# Patient Record
Sex: MALE | Race: OTHER | HISPANIC OR LATINO | URBAN - METROPOLITAN AREA
[De-identification: names, ages, dates, MRNs, and addresses within clinical notes are randomized per-mention and may not be internally consistent; named-entity substitution may affect disease eponyms.]

---

## 2023-06-18 ENCOUNTER — EMERGENCY (EMERGENCY)
Facility: HOSPITAL | Age: 23
LOS: 1 days | Discharge: HOME | End: 2023-06-18
Attending: EMERGENCY MEDICINE | Admitting: EMERGENCY MEDICINE
Payer: MEDICAID

## 2023-06-18 VITALS
OXYGEN SATURATION: 99 % | RESPIRATION RATE: 18 BRPM | DIASTOLIC BLOOD PRESSURE: 81 MMHG | HEART RATE: 70 BPM | TEMPERATURE: 98 F | SYSTOLIC BLOOD PRESSURE: 107 MMHG

## 2023-06-18 VITALS
SYSTOLIC BLOOD PRESSURE: 114 MMHG | WEIGHT: 289.91 LBS | OXYGEN SATURATION: 97 % | RESPIRATION RATE: 17 BRPM | HEIGHT: 76 IN | TEMPERATURE: 98 F | DIASTOLIC BLOOD PRESSURE: 76 MMHG | HEART RATE: 82 BPM

## 2023-06-18 PROCEDURE — 99282 EMERGENCY DEPT VISIT SF MDM: CPT

## 2023-06-18 PROCEDURE — 99284 EMERGENCY DEPT VISIT MOD MDM: CPT

## 2023-06-18 RX ORDER — MULTIVIT WITH MIN/MFOLATE/K2 340-15/3 G
296 POWDER (GRAM) ORAL ONCE
Refills: 0 | Status: COMPLETED | OUTPATIENT
Start: 2023-06-18 | End: 2023-06-18

## 2023-06-18 RX ADMIN — Medication 296 MILLILITER(S): at 15:26

## 2023-06-18 NOTE — ED PROVIDER NOTE - CLINICAL SUMMARY MEDICAL DECISION MAKING FREE TEXT BOX
Pt with constipation x several days - appears well, no clinical symptoms to suggest obstruction, abd soft, pt with poor diet.     Counseled on diet modification and given mag citrate in ED.

## 2023-06-18 NOTE — ED ADULT TRIAGE NOTE - CHIEF COMPLAINT QUOTE
Pt reporting constipation and abdominal pain x months worse over past 2 weeks. LBM yesterday. No pmhx, no abdominal surgeries.

## 2023-06-18 NOTE — ED PROVIDER NOTE - NSFOLLOWUPINSTRUCTIONS_ED_ALL_ED_FT
Take magnesium citrate as indicated.  On a daily basis, drink plenty of water, eat fruits and vegetables and take metamucil and colace (docusate) over the counter as a stool softener.      Constipation, Adult  Constipation is when a person has fewer than three bowel movements in a week, has difficulty having a bowel movement, or has stools (feces) that are dry, hard, or larger than normal. Constipation may be caused by an underlying condition. It may become worse with age if a person takes certain medicines and does not take in enough fluids.    Follow these instructions at home:  Eating and drinking      Eat foods that have a lot of fiber, such as beans, whole grains, and fresh fruits and vegetables.  Limit foods that are low in fiber and high in fat and processed sugars, such as fried or sweet foods. These include french fries, hamburgers, cookies, candies, and soda.  Drink enough fluid to keep your urine pale yellow.  General instructions    Exercise regularly or as told by your health care provider. Try to do 150 minutes of moderate exercise each week.  Use the bathroom when you have the urge to go. Do not hold it in.  Take over-the-counter and prescription medicines only as told by your health care provider. This includes any fiber supplements.  During bowel movements:  Practice deep breathing while relaxing the lower abdomen.  Practice pelvic floor relaxation.  Watch your condition for any changes. Let your health care provider know about them.  Keep all follow-up visits as told by your health care provider. This is important.  Contact a health care provider if:  You have pain that gets worse.  You have a fever.  You do not have a bowel movement after 4 days.  You vomit.  You are not hungry or you lose weight.  You are bleeding from the opening between the buttocks (anus).  You have thin, pencil-like stools.  Get help right away if:  You have a fever and your symptoms suddenly get worse.  You leak stool or have blood in your stool.  Your abdomen is bloated.  You have severe pain in your abdomen.  You feel dizzy or you faint.  Summary  Constipation is when a person has fewer than three bowel movements in a week, has difficulty having a bowel movement, or has stools (feces) that are dry, hard, or larger than normal.  Eat foods that have a lot of fiber, such as beans, whole grains, and fresh fruits and vegetables.  Drink enough fluid to keep your urine pale yellow.  Take over-the-counter and prescription medicines only as told by your health care provider. This includes any fiber supplements.  This information is not intended to replace advice given to you by your health care provider. Make sure you discuss any questions you have with your health care provider.    Document Revised: 11/04/2020 Document Reviewed: 11/04/2020

## 2023-06-18 NOTE — ED PROVIDER NOTE - OBJECTIVE STATEMENT
23 y/o m with no sig pmh presents to ED with constipation x several days.  As per patient, this has happened to him intermittently in the past and pt has taken laxative which has helped. Pt states he only eats once daily and does not eat fruits or vegetables and does not drink a lot of water.  Denies nausea, vomiting, fever, chills, urinary symptoms.  NO hx of abd surgeries.

## 2023-06-18 NOTE — ED PROVIDER NOTE - CHIEF COMPLAINT
Bed: 45  Expected date: 12/16/20  Expected time:   Means of arrival:   Comments:  charge   The patient is a 22y Male complaining of abdominal pain.

## 2023-06-18 NOTE — ED PROVIDER NOTE - HISTORY ATTESTATION, MLM
Urine, green and lavender top sent to lab prior to orders placed     Valeria Paez, [facility]0 [facility]  11/28/21 3426 I have reviewed and confirmed nurses' notes...

## 2023-06-18 NOTE — ED ADULT NURSE NOTE - OBJECTIVE STATEMENT
Pt presenting with 2 weeks of constipation. Last BM yesterday states was small and green. Had 3 episodes of darker stool that has resolved. Endorsing nausea this morning with no vomiting that has since resolved. No fevers.

## 2023-06-18 NOTE — ED PROVIDER NOTE - PATIENT PORTAL LINK FT
You can access the FollowMyHealth Patient Portal offered by [facility] by registering at the following website: [email or web address]. By joining [employer] [facility]’s FollowMyHealth portal, you will also be able to view your health information using other applications (apps) compatible with our system.

## 2023-06-18 NOTE — ED ADULT NURSE NOTE - NSFALLUNIVINTERV_ED_ALL_ED
Bed/Stretcher in lowest position, wheels locked, appropriate side rails in place/Call bell, personal items and telephone in reach/Instruct patient to call for assistance before getting out of bed/chair/stretcher/Non-slip footwear applied when patient is off stretcher/[place] to call system/Physically safe environment - no spills, clutter or unnecessary equipment/Purposeful proactive rounding/Room/bathroom lighting operational, light cord in reach

## 2023-06-20 DIAGNOSIS — K59.00 CONSTIPATION, UNSPECIFIED: ICD-10-CM

## 2023-06-20 DIAGNOSIS — R10.9 UNSPECIFIED ABDOMINAL PAIN: ICD-10-CM

## 2023-06-22 ENCOUNTER — EMERGENCY (EMERGENCY)
Facility: HOSPITAL | Age: 23
LOS: 1 days | Discharge: HOME | End: 2023-06-22
Attending: STUDENT IN AN ORGANIZED HEALTH CARE EDUCATION/TRAINING PROGRAM | Admitting: STUDENT IN AN ORGANIZED HEALTH CARE EDUCATION/TRAINING PROGRAM
Payer: MEDICAID

## 2023-06-22 VITALS
RESPIRATION RATE: 16 BRPM | HEIGHT: 76 IN | HEART RATE: 73 BPM | WEIGHT: 289.91 LBS | OXYGEN SATURATION: 99 % | SYSTOLIC BLOOD PRESSURE: 114 MMHG | TEMPERATURE: 98 F | DIASTOLIC BLOOD PRESSURE: 69 MMHG

## 2023-06-22 VITALS
HEART RATE: 65 BPM | DIASTOLIC BLOOD PRESSURE: 71 MMHG | OXYGEN SATURATION: 99 % | RESPIRATION RATE: 18 BRPM | SYSTOLIC BLOOD PRESSURE: 118 MMHG | TEMPERATURE: 98 F

## 2023-06-22 DIAGNOSIS — R10.84 GENERALIZED ABDOMINAL PAIN: ICD-10-CM

## 2023-06-22 DIAGNOSIS — R11.0 NAUSEA: ICD-10-CM

## 2023-06-22 LAB
ALBUMIN SERPL ELPH-MCNC: 4.2 G/DL — SIGNIFICANT CHANGE UP (ref 3.3–5)
ALP SERPL-CCNC: 105 U/L — SIGNIFICANT CHANGE UP (ref 40–120)
ALT FLD-CCNC: 18 U/L — SIGNIFICANT CHANGE UP (ref 10–45)
ANION GAP SERPL CALC-SCNC: 13 MMOL/L — SIGNIFICANT CHANGE UP (ref 5–17)
APPEARANCE UR: CLEAR — SIGNIFICANT CHANGE UP
AST SERPL-CCNC: 16 U/L — SIGNIFICANT CHANGE UP (ref 10–40)
BASOPHILS # BLD AUTO: 0.04 K/UL — SIGNIFICANT CHANGE UP (ref 0–0.2)
BASOPHILS NFR BLD AUTO: 0.5 % — SIGNIFICANT CHANGE UP (ref 0–2)
BILIRUB SERPL-MCNC: 0.3 MG/DL — SIGNIFICANT CHANGE UP (ref 0.2–1.2)
BILIRUB UR-MCNC: NEGATIVE — SIGNIFICANT CHANGE UP
BUN SERPL-MCNC: 10 MG/DL — SIGNIFICANT CHANGE UP (ref 7–23)
CALCIUM SERPL-MCNC: 9.6 MG/DL — SIGNIFICANT CHANGE UP (ref 8.4–10.5)
CHLORIDE SERPL-SCNC: 102 MMOL/L — SIGNIFICANT CHANGE UP (ref 96–108)
CO2 SERPL-SCNC: 28 MMOL/L — SIGNIFICANT CHANGE UP (ref 22–31)
COLOR SPEC: YELLOW — SIGNIFICANT CHANGE UP
CREAT SERPL-MCNC: 0.95 MG/DL — SIGNIFICANT CHANGE UP (ref 0.5–1.3)
DIFF PNL FLD: NEGATIVE — SIGNIFICANT CHANGE UP
EGFR: 118 ML/MIN/1.73M2 — SIGNIFICANT CHANGE UP
EOSINOPHIL # BLD AUTO: 0.1 K/UL — SIGNIFICANT CHANGE UP (ref 0–0.5)
EOSINOPHIL NFR BLD AUTO: 1.2 % — SIGNIFICANT CHANGE UP (ref 0–6)
GLUCOSE SERPL-MCNC: 93 MG/DL — SIGNIFICANT CHANGE UP (ref 70–99)
GLUCOSE UR QL: NEGATIVE — SIGNIFICANT CHANGE UP
HCT VFR BLD CALC: 45.8 % — SIGNIFICANT CHANGE UP (ref 39–50)
HGB BLD-MCNC: 15.2 G/DL — SIGNIFICANT CHANGE UP (ref 13–17)
IMM GRANULOCYTES NFR BLD AUTO: 0.2 % — SIGNIFICANT CHANGE UP (ref 0–0.9)
KETONES UR-MCNC: NEGATIVE — SIGNIFICANT CHANGE UP
LEUKOCYTE ESTERASE UR-ACNC: NEGATIVE — SIGNIFICANT CHANGE UP
LIDOCAIN IGE QN: 32 U/L — SIGNIFICANT CHANGE UP (ref 7–60)
LYMPHOCYTES # BLD AUTO: 2.27 K/UL — SIGNIFICANT CHANGE UP (ref 1–3.3)
LYMPHOCYTES # BLD AUTO: 28.2 % — SIGNIFICANT CHANGE UP (ref 13–44)
MCHC RBC-ENTMCNC: 28.4 PG — SIGNIFICANT CHANGE UP (ref 27–34)
MCHC RBC-ENTMCNC: 33.2 GM/DL — SIGNIFICANT CHANGE UP (ref 32–36)
MCV RBC AUTO: 85.6 FL — SIGNIFICANT CHANGE UP (ref 80–100)
MONOCYTES # BLD AUTO: 0.88 K/UL — SIGNIFICANT CHANGE UP (ref 0–0.9)
MONOCYTES NFR BLD AUTO: 10.9 % — SIGNIFICANT CHANGE UP (ref 2–14)
NEUTROPHILS # BLD AUTO: 4.73 K/UL — SIGNIFICANT CHANGE UP (ref 1.8–7.4)
NEUTROPHILS NFR BLD AUTO: 59 % — SIGNIFICANT CHANGE UP (ref 43–77)
NITRITE UR-MCNC: NEGATIVE — SIGNIFICANT CHANGE UP
NRBC # BLD: 0 /100 WBCS — SIGNIFICANT CHANGE UP (ref 0–0)
PH UR: 6 — SIGNIFICANT CHANGE UP (ref 5–8)
PLATELET # BLD AUTO: 219 K/UL — SIGNIFICANT CHANGE UP (ref 150–400)
POTASSIUM SERPL-MCNC: 3.8 MMOL/L — SIGNIFICANT CHANGE UP (ref 3.5–5.3)
POTASSIUM SERPL-SCNC: 3.8 MMOL/L — SIGNIFICANT CHANGE UP (ref 3.5–5.3)
PROT SERPL-MCNC: 7.5 G/DL — SIGNIFICANT CHANGE UP (ref 6–8.3)
PROT UR-MCNC: NEGATIVE MG/DL — SIGNIFICANT CHANGE UP
RBC # BLD: 5.35 M/UL — SIGNIFICANT CHANGE UP (ref 4.2–5.8)
RBC # FLD: 13 % — SIGNIFICANT CHANGE UP (ref 10.3–14.5)
SODIUM SERPL-SCNC: 143 MMOL/L — SIGNIFICANT CHANGE UP (ref 135–145)
SP GR SPEC: >=1.03 — SIGNIFICANT CHANGE UP (ref 1–1.03)
UROBILINOGEN FLD QL: 1 E.U./DL — SIGNIFICANT CHANGE UP
WBC # BLD: 8.04 K/UL — SIGNIFICANT CHANGE UP (ref 3.8–10.5)
WBC # FLD AUTO: 8.04 K/UL — SIGNIFICANT CHANGE UP (ref 3.8–10.5)

## 2023-06-22 PROCEDURE — 96374 THER/PROPH/DIAG INJ IV PUSH: CPT

## 2023-06-22 PROCEDURE — 83690 ASSAY OF LIPASE: CPT

## 2023-06-22 PROCEDURE — 99284 EMERGENCY DEPT VISIT MOD MDM: CPT | Mod: 25

## 2023-06-22 PROCEDURE — 85025 COMPLETE CBC W/AUTO DIFF WBC: CPT

## 2023-06-22 PROCEDURE — 80053 COMPREHEN METABOLIC PANEL: CPT

## 2023-06-22 PROCEDURE — 96375 TX/PRO/DX INJ NEW DRUG ADDON: CPT

## 2023-06-22 PROCEDURE — 36415 COLL VENOUS BLD VENIPUNCTURE: CPT

## 2023-06-22 PROCEDURE — 99284 EMERGENCY DEPT VISIT MOD MDM: CPT

## 2023-06-22 PROCEDURE — 81003 URINALYSIS AUTO W/O SCOPE: CPT

## 2023-06-22 RX ORDER — FAMOTIDINE 10 MG/ML
20 INJECTION INTRAVENOUS ONCE
Refills: 0 | Status: COMPLETED | OUTPATIENT
Start: 2023-06-22 | End: 2023-06-22

## 2023-06-22 RX ORDER — KETOROLAC TROMETHAMINE 30 MG/ML
15 SYRINGE (ML) INJECTION ONCE
Refills: 0 | Status: DISCONTINUED | OUTPATIENT
Start: 2023-06-22 | End: 2023-06-22

## 2023-06-22 RX ADMIN — Medication 15 MILLIGRAM(S): at 17:27

## 2023-06-22 RX ADMIN — FAMOTIDINE 20 MILLIGRAM(S): 10 INJECTION INTRAVENOUS at 17:27

## 2023-06-22 RX ADMIN — Medication 30 MILLILITER(S): at 17:27

## 2023-06-22 NOTE — ED PROVIDER NOTE - PHYSICAL EXAMINATION
CONSTITUTIONAL: Awake, alert.  Nontoxic, no acute distress.    HEAD: Normocephalic, atraumatic.    EYES: Conjunctivae clear without exudates or hemorrhage. Sclera is non-icteric.    ENT: Normal appearing external ears, nose, mucous membranes moist.    NECK: supple, trachea midline.    HEART:  Normal rate, regular rhythm.  Heart sounds S1, S2.  No murmurs, rubs or gallops.    LUNGS:  No acute respiratory distress.  Non-tachypneic and non-labored.  Lungs are clear bilaterally with good aeration.  No wheezing, rales, rhonchi.    ABDOMEN: Normal appearing skin without lesions, rashes.  Normal bowel sounds x 4.  Soft, non-distended, non-tender in all four quadrants. No rebound or guarding. No hernias or masses palpable.  No pulsatile abdominal mass.   No CVA tenderness b/l.    GENITAL: Normal appearing genitalia. No penile discharge or lesions. No scrotal swelling or discoloration. Testes descended bilaterally, smooth, no masses. No high-riding teste/ No bell-clapper deformity.  +Cremaster reflex.  Epididymis nontender. No inguinal or femoral hernias.    MUSCULOSKELETAL:  Moving all extremities without issue.    SKIN: Skin in warm, dry and intact without rashes or lesions.  Appropriate color for ethnicity.    NEUROLOGICAL:  Patient is alert, oriented x person, place and time.    PSYCH: Appropriate mood and affect. Good judgment and insight.

## 2023-06-22 NOTE — ED PROVIDER NOTE - OBJECTIVE STATEMENT
21 y/o male w/ no sig pmh p/w constant, waxing/waning generalized abd pain x 2 wks, worse with sitting.  +nausea.  Seen on 6/18/2 for constipation, given mag citrate which he took 2 days ago and had BM without issue.  Last BM today.   Denies f/c, cough, cp, sob, vomiting, diarrhea, dysuria, hematuria, penile/ testicular pain/swelling.  Pt is not sexually active.  Denies prior abd surgery, recent travel.  Smokes marijuana daily.

## 2023-06-22 NOTE — ED PROVIDER NOTE - NS ED ROS FT
CONSTITUTIONAL: Denies fever and chills    HEENT: Denies cough    RESPIRATORY: Denies SOB    CARDIOVASCULAR: Denies chest pain.    GASTROINTESTINAL: +abdominal pain, nausea.  Denies vomiting and diarrhea    GENITOURINARY: Denies dysuria and hematuria.

## 2023-06-22 NOTE — ED PROVIDER NOTE - CLINICAL SUMMARY MEDICAL DECISION MAKING FREE TEXT BOX
23 y/o male w/ no sig pmh p/w constant, waxing/waning generalized abd pain x 2 wks, worse with sitting.  +nausea.  Seen on 6/18/2 for constipation, given mag citrate which he took 2 days ago and had BM without issue.  Last BM today.   Denies f/c, cough, cp, sob, vomiting, diarrhea, dysuria, hematuria, penile/ testicular pain/swelling.  Pt is not sexually active.  Denies prior abd surgery, recent travel.  Smokes marijuana daily.  VSS.  Exam grossly reassuring.  Soft ntnd abd.  Nl  exam  Plan for basic labs, lipase, ua  GI cocktail/ pain meds, re-eval  Do not suspect acute abdomen or acute emergent condition at this time based on reassuring exam.  Would hold on advanced imaging at this time  Likely refer to GI

## 2023-06-22 NOTE — ED PROVIDER NOTE - PATIENT PORTAL LINK FT
You can access the FollowMyHealth Patient Portal offered by [facility] by registering at the following website: [email or web address]. By joining [employer]’s FollowMyHealth portal, you will also be able to view your health information using other applications (apps) compatible with our system.

## 2023-06-22 NOTE — ED ADULT NURSE NOTE - NSFALLUNIVINTERV_ED_ALL_ED
Bed/Stretcher in lowest position, wheels locked, appropriate side rails in place/Call bell, personal items and telephone in reach/Instruct patient to call for assistance before getting out of bed/chair/stretcher/Non-slip footwear applied when patient is off stretcher/[place] to call system/Physically safe environment - no spills, clutter or unnecessary equipment/Purposeful proactive rounding/Room/bathroom lighting operational, light cord in reach

## 2023-06-22 NOTE — ED PROVIDER NOTE - ATTENDING APP SHARED VISIT CONTRIBUTION OF CARE
23 y/o male w/ no sig pmh p/w constant, waxing/waning generalized abd pain x 2 wks, worse with sitting with associated nausea. Was seen several days ago in the ED with similar symptoms.  Patient states he smokes marijuana.  Patient denies associated fever, chills, congestion, cough, chest pain, shortness of breath, vomiting, diarrhea, dysuria, hematuria, leg edema, rash.   On exam, vital signs are within normal limits, GENERAL:  Awake, alert and in NAD, non-toxic appearing  ENMT: Airway patent  EYES: conjunctiva clear  CARDIAC: Regular rate, regular rhythm.  Heart sounds S1, S2, no S3, S4. No murmurs, rubs or gallops.  RESPIRATORY: Breath sounds clear and equal in bilateral anterior lung fields, no wheezes/ronchi/crackles/stridor; pt breathing and speaking comfortably with no increased WOB, no accessory mm. use, no intercostal retractions, no nasal flaring  GI: abdomen soft, non-distended, non-tender, no rebound or guarding in ruq/luq/llq/rlq/epigastrium. no cvat   SKIN: warm and dry, no rashes  PSYCH: awake, alert, calm and cooperative  EXTREMITIES: no ble edema  NEURO: gcs 15  ddx: pud vs gastritis vs gerd vs pancreatitis.  as patient has no tenderness to palpation in abdomen, imaging was not felt to be indicated.  However patient's   at bedside inquired about a right upper quadrant ultrasound.  I did offer the right upper quadrant ultrasound but explained that as patient had no tenderness and feels improved, it is not indicated however it can be ordered today.  Patient declines the right upper quadrant ultrasound and states he may pursue this test outpatient.  I explained to patient that she should also follow-up with a GI doctor and PMD.  And to stop smoking marijuana and tobacco.  Patient states he understands, lab work and urine is normal.  Patient states he feels improved after meds.  Will DC with PMD and GI follow-up

## 2023-06-22 NOTE — ED ADULT NURSE NOTE - OBJECTIVE STATEMENT
pt received into spot E A&Ox4 ambulatory appears comfortable arrives via walk in triage for eval of mid to lower abd pain waxing and waning in severity x 2 weeks sometimes worse after eating. Reports nausea no vomiting. Denies diarrhea constipation reporting last BM was today normal  for him. Respirations unlabored abd nondistended nontender. no le swelling no cp sob no psh. Denies alcohol or tobacco use reports smoking marijuana iv placed labs and urine sent medicated per orders

## 2023-06-22 NOTE — ED PROVIDER NOTE - NSFOLLOWUPINSTRUCTIONS_ED_ALL_ED_FT
Please reach out to Gay Martini ([facility] ED clinical referral coordinator) to assist you with your follow-up appointment with a primary care doctor and gastroenterologist.     Monday - Friday 11am-7pm  [phone]  [email or web address]     1. You were seen for abdominal pain. A copy of any of your resulted labs, imaging, and findings have been provided to you. Make sure to view any test results that may not have yet resulted at the time of your discharge by creating a FollowMyHealth account at: [email or web address] to sign up for FollowMyHealth. Please review all of your lab, imaging, and all other results in their entirety with your primary care doctor.   2. Continue to take your home medications as prescribed.   3. Follow up with a gastroenterologist and your primary care doctor within 48 hours to assess the symptoms you were seen for in the emergency department and to review all results from your visit. If you don't have a doctor, call [phone] to make an appointment.  4. Return immediately to the emergency department for new, persistent, or worsening symptoms or signs. Return immediately to the emergency department if you have chest pain, shortness of breath, loss of consciousness, vomiting, or worsening abdominal pain.   5. For your for health, you should make healthy food choices and be physically active. Also, you should not smoke or use drugs, and you should not drink alcohol in excess. Please visit [email or web address] for resources and more information.

## 2023-06-22 NOTE — ED PROVIDER NOTE - CARE PROVIDER_API CALL
Luis E Walsh  Gastroenterology  [address]  [place] [address]  Phone: [phone]  Fax: [phone]  Follow Up Time:

## 2023-06-22 NOTE — ED ADULT TRIAGE NOTE - CHIEF COMPLAINT QUOTE
pt complaining of mid/lower abd pain x 2 weeks with nausea no vomiting. denies diarrhea constipation urinary symptoms. no surgical hx

## 2024-08-14 NOTE — ED ADULT NURSE NOTE - ISOLATION TYPE:
Medication passed protocol.    Disp Refills Start End    hydroCHLOROthiazide 25 MG tablet 90 tablet 0 4/18/2024 --    Sig - Route: Take 1 tablet by mouth daily.       Disp Refills Start End    lisinopril (ZESTRIL) 20 MG tablet 90 tablet 0 4/18/2024 --    Sig - Route: Take 1 tablet by mouth daily.        Disp Refills Start End    simvastatin (ZOCOR) 20 MG tablet 90 tablet 0 4/18/2024 --    Sig - Route: Take 1 tablet by mouth nightly.      Last office visit date: 4/23/24    Next appointment scheduled?: Yes   Number of refills given: 90 w/ 0 refills   None